# Patient Record
Sex: MALE
[De-identification: names, ages, dates, MRNs, and addresses within clinical notes are randomized per-mention and may not be internally consistent; named-entity substitution may affect disease eponyms.]

---

## 2020-11-04 PROBLEM — Z00.00 ENCOUNTER FOR PREVENTIVE HEALTH EXAMINATION: Status: ACTIVE | Noted: 2020-11-04

## 2020-11-05 ENCOUNTER — APPOINTMENT (OUTPATIENT)
Dept: UROLOGY | Facility: CLINIC | Age: 60
End: 2020-11-05
Payer: COMMERCIAL

## 2020-11-05 VITALS
WEIGHT: 273 LBS | BODY MASS INDEX: 35.79 KG/M2 | HEIGHT: 73.23 IN | TEMPERATURE: 97.6 F | SYSTOLIC BLOOD PRESSURE: 146 MMHG | HEART RATE: 61 BPM | DIASTOLIC BLOOD PRESSURE: 90 MMHG

## 2020-11-05 DIAGNOSIS — Z80.0 FAMILY HISTORY OF MALIGNANT NEOPLASM OF DIGESTIVE ORGANS: ICD-10-CM

## 2020-11-05 DIAGNOSIS — Z82.49 FAMILY HISTORY OF ISCHEMIC HEART DISEASE AND OTHER DISEASES OF THE CIRCULATORY SYSTEM: ICD-10-CM

## 2020-11-05 DIAGNOSIS — N52.9 MALE ERECTILE DYSFUNCTION, UNSPECIFIED: ICD-10-CM

## 2020-11-05 DIAGNOSIS — Z86.79 PERSONAL HISTORY OF OTHER DISEASES OF THE CIRCULATORY SYSTEM: ICD-10-CM

## 2020-11-05 DIAGNOSIS — U07.1 COVID-19: ICD-10-CM

## 2020-11-05 DIAGNOSIS — K63.5 POLYP OF COLON: ICD-10-CM

## 2020-11-05 DIAGNOSIS — Z78.9 OTHER SPECIFIED HEALTH STATUS: ICD-10-CM

## 2020-11-05 DIAGNOSIS — N43.40 SPERMATOCELE OF EPIDIDYMIS, UNSPECIFIED: ICD-10-CM

## 2020-11-05 DIAGNOSIS — M10.9 GOUT, UNSPECIFIED: ICD-10-CM

## 2020-11-05 DIAGNOSIS — K40.90 UNILATERAL INGUINAL HERNIA, W/OUT OBSTRUCTION OR GANGRENE, NOT SPECIFIED AS RECURRENT: ICD-10-CM

## 2020-11-05 DIAGNOSIS — Z80.42 FAMILY HISTORY OF MALIGNANT NEOPLASM OF PROSTATE: ICD-10-CM

## 2020-11-05 DIAGNOSIS — Z83.79 FAMILY HISTORY OF OTHER DISEASES OF THE DIGESTIVE SYSTEM: ICD-10-CM

## 2020-11-05 PROCEDURE — 99204 OFFICE O/P NEW MOD 45 MIN: CPT

## 2020-11-05 PROCEDURE — 99072 ADDL SUPL MATRL&STAF TM PHE: CPT

## 2020-11-05 RX ORDER — COLCHICINE 0.6 MG/1
0.6 CAPSULE ORAL
Refills: 0 | Status: ACTIVE | COMMUNITY

## 2020-11-05 RX ORDER — OLMESARTAN MEDOXOMIL 40 MG/1
40 TABLET, FILM COATED ORAL
Refills: 0 | Status: ACTIVE | COMMUNITY

## 2020-11-05 RX ORDER — ATENOLOL 50 MG/1
TABLET ORAL
Refills: 0 | Status: ACTIVE | COMMUNITY

## 2020-11-05 RX ORDER — CHROMIUM 200 MCG
TABLET ORAL
Refills: 0 | Status: ACTIVE | COMMUNITY

## 2020-11-05 RX ORDER — LORAZEPAM 0.5 MG/1
0.5 TABLET ORAL
Refills: 0 | Status: ACTIVE | COMMUNITY

## 2020-11-06 NOTE — HISTORY OF PRESENT ILLNESS
[Erectile Dysfunction] : Erectile Dysfunction [FreeTextEntry1] : Ross is a 60-year-old male of Occ[place] background who presents with the rise PSA after recent treatment for prostatitis like symptoms without any documentation of the presence of prostatitis.\par \par He also has mild erectile dysfunction which he says works well enough with the hundred milligrams of Viagra and though he’d rather not have to take it he understands that the options are all worse\par \par He denies lower urinary tract symptoms\par \par He also had a CAT scan that showed indeterminate renal lesions and a follow-up study was recommended within six months\par \par He comes in with a large degree of anxiety as his father had bilateral nephrectomy for renal carcinoma and eventually  from metastatic prostate cancer going to lungs

## 2020-11-06 NOTE — PHYSICAL EXAM
[General Appearance - Well Developed] : well developed [General Appearance - Well Nourished] : well nourished [Normal Appearance] : normal appearance [Well Groomed] : well groomed [General Appearance - In No Acute Distress] : no acute distress [Heart Rate And Rhythm] : Heart rate and rhythm were normal [Respiration, Rhythm And Depth] : normal respiratory rhythm and effort [Exaggerated Use Of Accessory Muscles For Inspiration] : no accessory muscle use [Auscultation Breath Sounds / Voice Sounds] : lungs were clear to auscultation bilaterally [Abdomen Soft] : soft [Abdomen Tenderness] : non-tender [Costovertebral Angle Tenderness] : no ~M costovertebral angle tenderness [Penis Abnormality] : normal uncircumcised penis [Testes Tenderness] : no tenderness of the testes [Anus Abnormality] : the anus and perineum were normal [Rectal Exam - Rectum] : digital rectal exam was normal [Prostate Tenderness] : the prostate was not tender [No Prostate Nodules] : no prostate nodules [Prostate Size ___ gm] : prostate size [unfilled] gm [Normal Station and Gait] : the gait and station were normal for the patient's age [] : no rash [Oriented To Time, Place, And Person] : oriented to person, place, and time [Affect] : the affect was normal [Mood] : the mood was normal [Not Anxious] : not anxious [Inguinal Lymph Nodes Enlarged Bilaterally] : inguinal [FreeTextEntry1] : DTR's & BC reflexes were intact

## 2020-11-06 NOTE — LETTER HEADER
[FreeTextEntry3] : Kell Pedroza M.D.\par Director of Urology\par [facility]\par [address] [place] [address], [address]\[place] [address]

## 2020-11-06 NOTE — ASSESSMENT
[FreeTextEntry1] : His physical exam was not very impressive. The prostate did not feel abnormal but is quite small and can’t explain his PSA values on density. However if he indeed had prostatitis that may give us a good answer.\par \par With respect to his renal lesions the report says too small to characterize and given the radiologist recommended follow-up in six months that’s what we will order.\par

## 2020-11-06 NOTE — LETTER BODY
[Dear  ___] : Dear  [unfilled], [Consult Letter:] : I had the pleasure of evaluating your patient, [unfilled]. [Please see my note below.] : Please see my note below. [Consult Closing:] : Thank you very much for allowing me to participate in the care of this patient.  If you have any questions, please do not hesitate to contact me. [Sincerely,] : Sincerely, [FreeTextEntry2] : Ketty Red MD\par [address] [facility]\[place] [address]\par

## 2022-05-11 ENCOUNTER — APPOINTMENT (OUTPATIENT)
Dept: UROLOGY | Facility: CLINIC | Age: 62
End: 2022-05-11
Payer: COMMERCIAL

## 2022-05-11 VITALS
SYSTOLIC BLOOD PRESSURE: 128 MMHG | WEIGHT: 274 LBS | HEIGHT: 73 IN | HEART RATE: 67 BPM | BODY MASS INDEX: 36.31 KG/M2 | DIASTOLIC BLOOD PRESSURE: 78 MMHG

## 2022-05-11 PROCEDURE — 99215 OFFICE O/P EST HI 40 MIN: CPT

## 2022-05-11 RX ORDER — CLOPIDOGREL 75 MG/1
75 TABLET, FILM COATED ORAL
Refills: 0 | Status: ACTIVE | COMMUNITY

## 2022-05-11 RX ORDER — DOXYCYCLINE HYCLATE 100 MG/1
100 TABLET ORAL
Refills: 0 | Status: DISCONTINUED | COMMUNITY
End: 2022-05-11

## 2022-05-11 RX ORDER — SPIRONOLACTONE 50 MG/1
TABLET ORAL
Refills: 0 | Status: ACTIVE | COMMUNITY

## 2022-05-11 RX ORDER — AMLODIPINE BESYLATE 5 MG/1
5 TABLET ORAL
Refills: 0 | Status: COMPLETED | COMMUNITY
End: 2022-05-11

## 2022-05-11 RX ORDER — ATORVASTATIN CALCIUM 40 MG/1
40 TABLET, FILM COATED ORAL
Refills: 0 | Status: ACTIVE | COMMUNITY

## 2022-05-11 RX ORDER — TAMSULOSIN HYDROCHLORIDE 0.4 MG/1
0.4 CAPSULE ORAL
Refills: 0 | Status: COMPLETED | COMMUNITY
End: 2022-05-11

## 2022-05-11 RX ORDER — ICOSAPENT ETHYL 500 MG/1
CAPSULE ORAL
Refills: 0 | Status: ACTIVE | COMMUNITY

## 2022-05-11 RX ORDER — BACILLUS COAGULANS/INULIN 1B-250 MG
CAPSULE ORAL
Refills: 0 | Status: COMPLETED | COMMUNITY
End: 2022-05-11

## 2022-05-11 RX ORDER — VITAMIN E (DL,TOCOPHERYL ACET) 180 MG
500 CAPSULE ORAL
Refills: 0 | Status: COMPLETED | COMMUNITY
End: 2022-05-11

## 2022-05-11 RX ORDER — APIXABAN 5 MG/1
5 TABLET, FILM COATED ORAL
Refills: 0 | Status: ACTIVE | COMMUNITY

## 2022-05-11 RX ORDER — SULFAMETHOXAZOLE AND TRIMETHOPRIM 400; 80 MG/1; MG/1
TABLET ORAL
Refills: 0 | Status: DISCONTINUED | COMMUNITY
End: 2022-05-11

## 2022-05-11 RX ORDER — DILTIAZEM HYDROCHLORIDE 60 MG/1
60 TABLET ORAL
Refills: 0 | Status: ACTIVE | COMMUNITY

## 2022-05-11 NOTE — LETTER HEADER
[FreeTextEntry3] : Kell Pedroza M.D.\par Director Emeritus of Urology\par [facility]\par [address] [place] [address], [address]\[place] [address]

## 2022-05-11 NOTE — LETTER BODY
[Dear  ___] : Dear  [unfilled], [Courtesy Letter:] : I had the pleasure of seeing your patient, [unfilled], in my office today. [Please see my note below.] : Please see my note below. [Sincerely,] : Sincerely, [FreeTextEntry2] : Ketty Red MD\par [address]   [facility]\[place] [address]\par [phone]

## 2022-05-11 NOTE — HISTORY OF PRESENT ILLNESS
[FreeTextEntry1] : Ross is a 61-year-old male born May 28, 1960 who I saw in November 2020, he had a rising PSA and a CT scan that showed some indeterminate renal lesions.  We sent him for 4K that he never did a CT urogram that he never did his had a rather complicated medical history since I last saw him including the need for a cardiac stent for which he is now on anticoagulation and antiplatelet drugs, acute prostatitis for which she was treated by Dr. Serrano in [facility] with a months worth of doxycycline PSA had gone up to 4 and after the doxycycline it went down but it is only down to 3.9.  He has no trouble urinating he has trouble with erections but that is not something that we are treating right now as he just got his stent is coming back now 51 we can do better PSA, the need for the CT urogram if still needed

## 2022-05-11 NOTE — PHYSICAL EXAM
[General Appearance - Well Developed] : well developed [General Appearance - Well Nourished] : well nourished [Normal Appearance] : normal appearance [Well Groomed] : well groomed [General Appearance - In No Acute Distress] : no acute distress [Abdomen Soft] : soft [Abdomen Tenderness] : non-tender [Costovertebral Angle Tenderness] : no ~M costovertebral angle tenderness [Urethral Meatus] : meatus normal [Penis Abnormality] : normal uncircumcised penis [Epididymis] : the epididymides were normal [Testes Tenderness] : no tenderness of the testes [Testes Mass (___cm)] : there were no testicular masses [Anus Abnormality] : the anus and perineum were normal [Prostate Enlargement] : the prostate was not enlarged [Prostate Tenderness] : the prostate was not tender [No Prostate Nodules] : no prostate nodules [Prostate Size ___ gm] : prostate size [unfilled] gm [] : no respiratory distress [Respiration, Rhythm And Depth] : normal respiratory rhythm and effort [Exaggerated Use Of Accessory Muscles For Inspiration] : no accessory muscle use [Auscultation Breath Sounds / Voice Sounds] : lungs were clear to auscultation bilaterally [Oriented To Time, Place, And Person] : oriented to person, place, and time [Affect] : the affect was normal [Mood] : the mood was normal [Not Anxious] : not anxious [FreeTextEntry1] : DTR's & BC reflexes were intact

## 2022-05-11 NOTE — ASSESSMENT
[FreeTextEntry1] : As a totality that he is urinating well and his erections are not great but he would need [place] criteria before we would consider looking at it, his PSA is up now close to 4 while a year and a half ago it was in the mid twos.  He had a bout of prostatitis which reportedly is gone, there were no expressed prostatic secretions, his prostate on JAY felt quite small.  Additionally, his kidneys were cleared by an MRI\par \par Instead of getting a 4K which we have gotten away from we could consider an MRI of the prostate no given his current stent him not sure what would be able to do as far as treatment and biopsy.\par \par If we are not going to do a biopsy now I am not sure that it is appropriate to do an MRI now as by the time we do get clearance to do a biopsy the MRI may have changed

## 2022-11-17 ENCOUNTER — APPOINTMENT (OUTPATIENT)
Dept: UROLOGY | Facility: CLINIC | Age: 62
End: 2022-11-17

## 2022-11-17 VITALS
DIASTOLIC BLOOD PRESSURE: 78 MMHG | HEART RATE: 65 BPM | RESPIRATION RATE: 18 BRPM | SYSTOLIC BLOOD PRESSURE: 134 MMHG | BODY MASS INDEX: 36.31 KG/M2 | HEIGHT: 73 IN | TEMPERATURE: 98.3 F | OXYGEN SATURATION: 98 % | WEIGHT: 274 LBS

## 2022-11-17 DIAGNOSIS — N28.1 CYST OF KIDNEY, ACQUIRED: ICD-10-CM

## 2022-11-17 DIAGNOSIS — N28.9 DISORDER OF KIDNEY AND URETER, UNSPECIFIED: ICD-10-CM

## 2022-11-17 LAB
PSA FREE FLD-MCNC: 17 %
PSA FREE SERPL-MCNC: 0.7 NG/ML
PSA SERPL-MCNC: 4.19 NG/ML

## 2022-11-17 PROCEDURE — 99215 OFFICE O/P EST HI 40 MIN: CPT

## 2022-11-17 NOTE — ADDENDUM
[FreeTextEntry1] : Patient's note was transcribed with the assistance of a medical scribe under the supervision of Dr. Landry.\par I, Dr. Landry, have reviewed the patient's chart and agree that it aligns with my medical decisions.\par Jocy Buck, our scribe, also served as a chaperone for physical examination purposes.\par \par The submitted E/M billing level for this visit reflects the total time spent on the day of the visit including face-to-face time spent with the patient, non-face-to-face review of medical records and relevant information, documentation, and asynchronous communication with the patient after a visit via phone, email, or patient’s EHR portal after the visit. \par The medical records reviewed are either scanned into the chart or reviewed with the patient using a patient’s electronic medical records portal for patients with records not available to [facility] via electronic transmission platforms from other institutions and labs. \par Time spend counseling and performing coordination of care was also included in determining the appropriate EM billing level.\par

## 2022-11-17 NOTE — ASSESSMENT
[FreeTextEntry1] : CHERI NGUYEN is a 62 year old male family history of prostate and kidney cancer previously with elevated PSA in the setting of prostatitis who presents for evaluation of elevated PSA referred by Dr Hastings, also with adrenal adenoma, questionable renal lesions confirmed to be negative on dedicated MRI imaging of the kidneys 2021, found to have scarring/lobulated kidneys.\par \par Plan \par MRI prostate  & MRI abdomen reassess adrenal adenoma  , f/u to discuss results \par  U/A,UCX \par  for adrenal adenoma biochemical w/u- aldosterone serum  \par  metanephrine Plasma \par

## 2022-12-30 ENCOUNTER — LABORATORY RESULT (OUTPATIENT)
Age: 62
End: 2022-12-30

## 2023-01-05 ENCOUNTER — NON-APPOINTMENT (OUTPATIENT)
Age: 63
End: 2023-01-05

## 2023-01-05 LAB
ALDOSTERONE SERUM: 6.4 NG/DL
BACTERIA UR CULT: NORMAL
CORTIS 24H UR-MCNC: 24 H
CORTIS 24H UR-MRATE: 22 MCG/24 H
RENIN PLASMA: 8.8 PG/ML
SPECIMEN VOL 24H UR: 2700 ML

## 2023-01-06 ENCOUNTER — NON-APPOINTMENT (OUTPATIENT)
Age: 63
End: 2023-01-06

## 2023-01-06 LAB
METANEPHRINE, PL: 21.5 PG/ML
NORMETANEPHRINE, PL: 164.9 PG/ML

## 2023-01-12 ENCOUNTER — APPOINTMENT (OUTPATIENT)
Dept: UROLOGY | Facility: CLINIC | Age: 63
End: 2023-01-12
Payer: COMMERCIAL

## 2023-01-12 VITALS
SYSTOLIC BLOOD PRESSURE: 140 MMHG | HEART RATE: 67 BPM | DIASTOLIC BLOOD PRESSURE: 80 MMHG | WEIGHT: 274 LBS | TEMPERATURE: 98 F | OXYGEN SATURATION: 99 % | HEIGHT: 73 IN | BODY MASS INDEX: 36.31 KG/M2 | RESPIRATION RATE: 15 BRPM

## 2023-01-12 DIAGNOSIS — E27.8 OTHER SPECIFIED DISORDERS OF ADRENAL GLAND: ICD-10-CM

## 2023-01-12 DIAGNOSIS — R97.20 ELEVATED PROSTATE, SPECIFIC ANTIGEN [PSA]: ICD-10-CM

## 2023-01-12 PROCEDURE — 99215 OFFICE O/P EST HI 40 MIN: CPT

## 2023-01-12 NOTE — HISTORY OF PRESENT ILLNESS
[FreeTextEntry1] : CHERI NGUYEN is a 62 year old male family history of prostate and kidney cancer previously with elevated PSA in the setting of prostatitis who presents for evaluation of elevated PSA referred by Dr Hastings, also with adrenal adenoma, questionable renal lesions confirmed to be negative on dedicated MRI imaging of the kidneys 2021, found to have scarring/lobulated kidneys.\par \par Pt is having stable LUTS and at this time denies gross hematuria ,dysuria or associated symptoms. He notes nocturia 1x. \par \par aldosterone/renin ratio -6.4/8.8 = 0.73 \par biochemical workup 12/20/22 - 24 metanephrine - normal , cortisol -normal ,aldosterone 6.4,renin 8.8  \par Urine studies 12/2022 - UA: 7 RBCs//UCX - negative \par \par MRI abdomen images visualized 01/2023   - 2.1 cm adrenal adenoma noted to have grown from 1.8 cm adenoma in 05/2022 stable.\par \par MRI prostate which is visualized 01/2023 - 3 significant lesions noted on MRI(PL/PM/PL) , PIRADS 5,4 & 3 respectfully. No EPE , all three lesions abuts capsule . No lymphadenopathy.Prostate size~ 34.4 cc\par \par previously \par PSA 11/03/22 - 4.19 % free 17 \par \par MRI abdomen 05/11/22 images - both kidney lobulated in contour , possibly representing normal variation versus multifocal cortical scarring , limiting evaluation for exophytic lesions , but so suspicious mass identified. Few tiny subcentimeter scattered bilateral renal cysts . 1.8 cm right adrenal adenoma  \par \par  PMH - HX of pyelonephritis as a young adult\par Family History: of kidney cancer and prostate cancer \par Social History:  , from [facility]\par \par \par

## 2023-01-12 NOTE — ADDENDUM
[FreeTextEntry1] : Patient's note was transcribed with the assistance of a medical scribe under the supervision of Dr. Landry.\par I, Dr. Landry, have reviewed the patient's chart and agree that it aligns with my medical decisions.\par Jocy Buck, our scribe, also served as a chaperone for physical examination purposes.\par \par \par

## 2023-01-12 NOTE — ASSESSMENT
[FreeTextEntry1] : CHERI NGUYEN is a 62 year old male family history of prostate and kidney cancer previously with elevated PSA in the setting of prostatitis who presents for evaluation of elevated PSA referred by Dr Hastings, also with adrenal adenoma, questionable renal lesions confirmed to be negative on dedicated MRI imaging of the kidneys 2021, found to have scarring/lobulated kidneys.\par Repeat MRI abdomen demonstrates stable adrenal adenoma with negative biochemical work-up.\par Multiple suspicious lesions including PI-RADS 5 and MRI prostate.\par Microscopic hematuria\par \par Plan \par TP fusion prostate biopsy with  , we scanned in disc from regional \par cystoscopy at next visit , discussed the risks and benefits of this endoscopic procedure discussed with patient \par Follow-up after MRI prostate\par \par \par The patient understands that PSA is a marker of cancer risk but does not diagnose cancer. The patient also understands that there are a number of benign conditions including UTI, BPH, certain activities and prostatitis that will increase PSA in the absence of cancer. Unfortunately the only way to definitively diagnose cancer is with a prostate biopsy. We discussed the method of performing biopsy (transrectal or transperineal in the office) and the risks (bleeding, infection/sepsis, urinary retention, ED). We perform targeted fusion prostate biopsies in the operating room under anesthesia. \par \par \par \par

## 2023-02-07 ENCOUNTER — APPOINTMENT (OUTPATIENT)
Dept: UROLOGY | Facility: CLINIC | Age: 63
End: 2023-02-07